# Patient Record
Sex: FEMALE | Race: WHITE | NOT HISPANIC OR LATINO | URBAN - METROPOLITAN AREA
[De-identification: names, ages, dates, MRNs, and addresses within clinical notes are randomized per-mention and may not be internally consistent; named-entity substitution may affect disease eponyms.]

---

## 2024-02-14 ENCOUNTER — OFFICE VISIT (OUTPATIENT)
Dept: OBSTETRICS AND GYNECOLOGY | Facility: CLINIC | Age: 44
End: 2024-02-14
Payer: COMMERCIAL

## 2024-02-14 VITALS — SYSTOLIC BLOOD PRESSURE: 122 MMHG | WEIGHT: 140.38 LBS | DIASTOLIC BLOOD PRESSURE: 86 MMHG

## 2024-02-14 DIAGNOSIS — R10.2 PELVIC PAIN: ICD-10-CM

## 2024-02-14 PROCEDURE — 99203 OFFICE O/P NEW LOW 30 MIN: CPT | Performed by: OBSTETRICS & GYNECOLOGY

## 2024-02-14 RX ORDER — ZOLPIDEM TARTRATE 10 MG/1
TABLET ORAL
COMMUNITY
Start: 2016-03-22

## 2024-02-14 RX ORDER — LOPERAMIDE HYDROCHLORIDE 2 MG/1
CAPSULE ORAL
COMMUNITY
Start: 2023-12-03

## 2024-02-14 RX ORDER — ONDANSETRON HYDROCHLORIDE 8 MG/1
TABLET, FILM COATED ORAL
COMMUNITY

## 2024-02-14 RX ORDER — LOPERAMIDE HCL 1MG/7.5ML
LIQUID (ML) ORAL 2 TIMES DAILY
COMMUNITY

## 2024-02-14 RX ORDER — LEVOTHYROXINE SODIUM 50 UG/1
TABLET ORAL EVERY 24 HOURS
COMMUNITY
Start: 2023-10-13

## 2024-02-14 RX ORDER — TRAMADOL HYDROCHLORIDE 50 MG/1
TABLET ORAL
COMMUNITY
End: 2024-02-22 | Stop reason: ALTCHOICE

## 2024-02-14 NOTE — PROGRESS NOTES
Subjective   Patient ID: Ermelinda Curry is a 43 y.o. female G 12 P 5 who presents for Pelvic Pain.  HPI  Has hx of endometriosis in  , started worsening pain last year.   Pain radiates to legs and back   Also has Factor V , and many recurrent preg losses. So isnt taking any hormonal birth control    x 5   Getting eval for GI issues.   Takes Tylenol w codeine fr Rheumatologist.      Review of Systems  Many GI sx and also pelvic pain , pain w sex.       Objective   Physical Exam  Not done    See US fr [facility], small 1 cm fibroid. No masses , small uterus.   CT scan wnl       Assessment/Plan   Diagnoses and all orders for this visit:  Pelvic pain  -     Referral to Gynecology; Future  Pt best served by specialist for pelvic pain and endometriosis Referral to DR Oh      Pt agrees.    Arlet Galloway MD

## 2024-02-15 ENCOUNTER — TELEPHONE (OUTPATIENT)
Dept: OBSTETRICS AND GYNECOLOGY | Facility: CLINIC | Age: 44
End: 2024-02-15
Payer: COMMERCIAL

## 2024-02-15 NOTE — TELEPHONE ENCOUNTER
Dr. Galloway's office called yesterday re scheduling a follow up apt with this pt with Dr. Oh. Pt is scheduled for 2/22/24 and asked if there would be a procedure done (FUV for pelvic pain/endomet) or if it was just going to be a discussion. Please advise.

## 2024-02-22 ENCOUNTER — OFFICE VISIT (OUTPATIENT)
Dept: OBSTETRICS AND GYNECOLOGY | Facility: CLINIC | Age: 44
End: 2024-02-22
Payer: COMMERCIAL

## 2024-02-22 ENCOUNTER — APPOINTMENT (OUTPATIENT)
Dept: OBSTETRICS AND GYNECOLOGY | Facility: CLINIC | Age: 44
End: 2024-02-22
Payer: COMMERCIAL

## 2024-02-22 VITALS
WEIGHT: 140 LBS | SYSTOLIC BLOOD PRESSURE: 112 MMHG | HEIGHT: 64 IN | DIASTOLIC BLOOD PRESSURE: 62 MMHG | BODY MASS INDEX: 23.9 KG/M2

## 2024-02-22 DIAGNOSIS — N80.9 ENDOMETRIOSIS: Primary | ICD-10-CM

## 2024-02-22 DIAGNOSIS — N80.03 ADENOMYOSIS: ICD-10-CM

## 2024-02-22 DIAGNOSIS — D68.51 FACTOR 5 LEIDEN MUTATION, HETEROZYGOUS (MULTI): ICD-10-CM

## 2024-02-22 DIAGNOSIS — R10.2 PELVIC PAIN: ICD-10-CM

## 2024-02-22 PROCEDURE — 99215 OFFICE O/P EST HI 40 MIN: CPT | Performed by: STUDENT IN AN ORGANIZED HEALTH CARE EDUCATION/TRAINING PROGRAM

## 2024-02-22 RX ORDER — CYCLOBENZAPRINE HCL 5 MG
5 TABLET ORAL 3 TIMES DAILY
Qty: 30 TABLET | Refills: 0 | Status: SHIPPED | OUTPATIENT
Start: 2024-02-22 | End: 2024-03-03

## 2024-02-22 RX ORDER — IBUPROFEN 800 MG/1
800 TABLET ORAL EVERY 8 HOURS PRN
Qty: 90 TABLET | Refills: 0 | Status: SHIPPED | OUTPATIENT
Start: 2024-02-22 | End: 2024-03-29

## 2024-02-22 RX ORDER — ACETAMINOPHEN 325 MG/1
975 TABLET ORAL ONCE
Status: CANCELLED | OUTPATIENT
Start: 2024-02-22 | End: 2024-02-22

## 2024-02-22 RX ORDER — GABAPENTIN 600 MG/1
600 TABLET ORAL ONCE
Status: CANCELLED | OUTPATIENT
Start: 2024-02-22 | End: 2024-02-22

## 2024-02-22 RX ORDER — HEPARIN SODIUM 5000 [USP'U]/ML
5000 INJECTION, SOLUTION INTRAVENOUS; SUBCUTANEOUS ONCE
Status: CANCELLED | OUTPATIENT
Start: 2024-02-22 | End: 2024-02-22

## 2024-02-22 NOTE — PROGRESS NOTES
Division of Minimally Invasive Gynecologic Surgery  [facility]    24 Gynecology Visit    CC:   Chief Complaint   Patient presents with    Consult       Ermelinda Curry is a 43 y.o. referred to our practice by Dr. Galloway.    Had laparoscopy at age 19 which diagnosed her endometriosis. Unsure if they fulgurated or excised. Pain improved after surgery for a few years. Since her last child her pain has worsened. Pain is constant descried as period cramps, rated a 7/8. Pain is constant since January but previously was only related to menses.  Heating pad/baths help. Tylenol with codeine doesn't help. Has monthly menses lasting 2 days but pain is significant. Pain starts 5 days before and then stays for a couple days after. Has not been on medication to regulate periods.     GI symptoms: crampy bowel movement. No blood in stool. Has eosinophilic gastritis.   Urinary symptoms: denies  Sexual activity: abstaining due to pain. Pain with deep penetration. Has to stop due to pain,    Prior Therapies: OCPs    Imaging: TVUS: Retroverted fibroid uterus that measures 93 mm x 60 mm x 62 mm. The endometrial   thickness is 4.2 mm. The fibroid is described below.   1. Size 28 mm x 22 mm x 27 mm. Mean 25.8 mm. Vol 8.813 cm³. FIGO score: 4 -   Intramural, left anterior   The myometrium appears heterogenous; likely consistent with adenomyosis.   Labs: H/H  15.9/47.1    GYN Hx  Gynecologic history:  Contraception/menstrual regulation: none  Desires Childbearing: denies  Last pap: NILM , no HPV testing performed  Her last mammogram was BI-RADS 1 in .   Colonoscopy:  benign polyps  Denies family history of breast, ovarian, uterine, colon or endometrial cancer.     OBHx: 5 x   Pertinent PMH: factor 5 leiden, RPL  Pertient PSH: laparoscopic treatment of endometriosis. XL-mesenteric cyst, cholecystectomy    PMHx, PSHx, SHx, Allergies, and Medications updated in Epic.  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    Other specified health status 04/07/2015    No pertinent past medical history     Past Surgical History:   Procedure Laterality Date    OTHER SURGICAL HISTORY  04/07/2015    History Of Prior Surgery     Allergies   Allergen Reactions    Bee Venom Protein (Honey Bee) Shortness of breath    Estrogens Other     Other reaction(s): Other: See Comments   Clotting disorder and worsening depression    Clotting disorder and worsening depression    Etonogestrel Other     I got very depressed    Fish Containing Products Anaphylaxis and Other     Shortness of breath, wheezing, and vomiting    Ketorolac Anaphylaxis, Unknown, Other and Shortness of breath     Pt states she takes motrin at home    Metoclopramide Anaphylaxis, Unknown, Other and Shortness of breath    Spinach Other     Other reaction(s): Other: See Comments   Upset stomach and migraine headache    Upset stomach and migraine headache    Bee Pollen Other    Ciprofloxacin Other    Copper Hives, Swelling and Unknown    Diphenhydramine Other     Other reaction(s): Other: See Comments   speed, muscle spasms    speed, muscle spasms    Fentanyl Other and Unknown     Other reaction(s): Other: See Comments   Migraine    Migraine    Fluoxetine Hives    Gluten Nausea/vomiting    Milk Containing Products (Dairy) Other    Nitrofurantoin Monohyd/M-Cryst Unknown    Peanut GI Upset    Promethazine Other and Unknown     Other reaction(s): Other: See Comments, Other: See Comments   Muscle spasms    Muscle spasms    Sulfa (Sulfonamide Antibiotics) Unknown    Latex Unknown and Rash     WHEEZING IF CLOSE TO NECK    Macrolide Antibiotics Rash    Penicillins Unknown, Other and Rash       Current Outpatient Medications:      "cyclobenzaprine (Flexeril) 5 mg tablet, Take 1 tablet (5 mg) by mouth 3 times a day for 10 days., Disp: 30 tablet, Rfl: 0    ibuprofen 800 mg tablet, Take 1 tablet (800 mg) by mouth every 8 hours if needed for mild pain (1 - 3)., Disp: 90 tablet, Rfl: 0    levothyroxine (Synthroid, Levoxyl) 50 mcg tablet, once every 24 hours., Disp: , Rfl:     loperamide (Imodium A-D) 1 mg/7.5 mL liquid, Take by mouth twice a day., Disp: , Rfl:     loperamide (Imodium) 2 mg capsule, take 1 capsule as needed Orally twice daily 30 days, Disp: , Rfl:     ondansetron (Zofran) 8 mg tablet, , Disp: , Rfl:     zolpidem (Ambien) 10 mg tablet, , Disp: , Rfl:   Social History     Socioeconomic History    Marital status:      Spouse name: Not on file    Number of children: Not on file    Years of education: Not on file    Highest education level: Not on file   Occupational History    Not on file   Tobacco Use    Smoking status: Not on file    Smokeless tobacco: Not on file   Substance and Sexual Activity    Alcohol use: Not on file    Drug use: Not on file    Sexual activity: Not on file   Other Topics Concern    Not on file   Social History Narrative    Not on file     Social Determinants of Health     Financial Resource Strain: Not on file   Food Insecurity: Not on file   Transportation Needs: Not on file   Physical Activity: Not on file   Stress: Not on file   Social Connections: Not on file   Intimate Partner Violence: Not on file   Housing Stability: Not on file     No family history on file.  OB History    No obstetric history on file.            ROS: reviewed and negative    PE:/62 (BP Location: Left arm, Patient Position: Sitting, BP Cuff Size: Adult)   Ht 1.626 m (5' 4\")   Wt 63.5 kg (140 lb)   LMP 02/09/2024   BMI 24.03 kg/m²   Gen: NAD  Psych: AAOx3  Skin: no lesions  Pulm: nonlabored breathing, normal respiratory effort  Cards: normal peripheral perfusion  Lymph: no LAD in axilla or groin  GI: soft, non-tender, " non-distended, no rebound/guarding, no masses. VM incision from umbilicus to sternum  :  External Genitalia: vulva without lesions, normal hair distribution  Urethral meatus: normal size and without massesBladder: non-tender, no masses or tenderness  Vagina: normal discharge, no lesions or masses  Cervix: without discharge or lesions, no cervical masses, tenderness with cervical manipulation  Uterus: fundal tenderness and not enlarged, mobile  Adnexa: non tender and not enlarged  Anus/Perineum: normal appearance      A/P: Ermelinda Curry is a 43 y.o. with chronic pelvic pain, history of endometriosis, adenomyosis desiring definitive management.     Endometriosis  -Counseling regarding endometriosis was conducted. We discussed the natural history of the disease, superficial endometriosis, endometriomas, and deeply infiltrating disease. Extensively reviewed the medical and surgical treatment options available for endometriosis including NSAIDs, OCPs, progestin therapy (Mirena IUD, depo-provera, norethindrone), GnRH agonists, GnRH antagonists, aromatase inhibitors (i.e. Letrozole), Orilissa (Elagolix tablets), as well as the surgical options including excision of endometriosis alone, excision of endometriosis +/- Mirena IUD +/- appendectomy, and a hysterectomy with preservation of her ovaries. We reviewed at length the modern treatment of endometriosis, recommending preservation of ovaries if normal, despite the presence of extensive disease due to morbidity and mortality benefits.  -We discussed management with surgery, both conservative and extirpative surgery. Recurrence of endometriosis requiring surgical intervention is highest with conservative management. However, this must be balanced against the patient's age and plans for future fertility. In one study published by Carol, women ages 30-39 had approximately equivalent risk of future surgery following hysterectomy or hysterectomy with bilateral oophorectomy.  We discussed the role of hormonal suppression following conservative management, and that this has been demonstrated to reduce the risk of recurrence of disease. We discussed that following extirpative surgery with hysterectomy and bilateral salpingo-oophorectomy, it is our practice to begin progestin-based therapy initially postoperatively, and then transition to estrogen therapy.  If the patient was done with child-bearing, I reviewed that I would generally recommend concurrent hysterectomy in order to also help improve uterine pain related to dysmenorrhea that may be a separate disease process altogether. If adnexal involvement of endometriosis was suspected based on imaging such as endometriosis or other known advanced endometriotic disease based on symptoms or previous procedures was suspected, we reviewed that management may involve the need for a unilateral salpingo-oophorectomy in cases when a cystectomy cannot be performed or significant scarring is encountered that does not allow for ovarian conservation for endometriosis treatment. In patients who do not desire fertility, we reviewed that similarly, the goal would be to maintain one functional ovary, but that if disease is too severe or high suspicion of likely needing repeat surgery in the near future if both ovaries were not removed, then would recommend BSO followed by HRT. We discussed that regardless of conservative or extirpative therapy, we recommended appendectomy given that patients with deep infiltrating endometriosis have 6-times the risk of appendiceal endometriosis compared to those patients without disease.  -The patient desires operative intervention with excision of endometriosis and hysterectomy for definitive management.     Surgical consent reviewed. Risks including bleeding (including need for blood transfusion in life-threatening situations; risks of transfusion including itching, rash, fever, headache or shock, respiratory distress,  kidney damage, systemic infection, exposure to blood borne viruses including hepatitis and HIV, and death), infection, damage to surrounding internal organs including but not limited to blood vessels, nerves, ovaries (if not planning on removing at the time of surgery), ureters, bladder, intestines, possible need for laparotomy and additional surgeries. There is also a risk of hernia, blood clots, as well as potential recovery and anesthesia complications. We also discussed removal of fallopian tube(s) and the reduction in ovarian/fallopian tube cancer with opportunistic salpingectomy. The patient understood all these risk. She understands that surgery does entail more risks than medical and non-invasive therapy. She desired to proceed with surgery following our discussion. Both verbal and written consent was obtained. The patient had the opportunity to answer question      Adenomyosis  Based on the patient's history/physical exam, the patient likely has a component of adenomyosis. Reviewed the diagnosis of adenomyosis with the patient. Discussed with the patient that while adenomyosis can be seen on a MRI with high specificity, it only has a 50% sensitivity. Hence the diagnosis of adenomyosis is generally that of a clinical diagnosis based on risk factors, history and physical exam findings (specifically fundal tenderness on uterine palpation). Reviewed that treatment options of adenomyosis are geared at reducing endometrial activity and hence blood that would be accumulating in the myometrium which is the source of pain and heavy bleeding. Reviewed that this generally is achieved generally best with continuous GLORIA/NuvaRing (if patient able to take estrogen containing birth control), Depo injections, or intrauterine devices (best with Mirena IUD). Reviewed that definitive management, especially if the patient has completed child-bearing, is generally via a hysterectomy which is both confirmatory and therapeutic.      Factor 5 leiden  - needs heparin preop on day of surgery.    Pelvic pain  - Rx motrin, flexeril prn          She will be posted for robotic assisted total laparoscopic hysterectomy, bilateral salpingectomy, excision of endometriosis, cystoscopy and all indicated procedures    RTC postop    Britt Oh MD  Division of Minimally Invasive Gynecologic Surgery  [facility]

## 2024-03-28 ENCOUNTER — ANESTHESIA EVENT (OUTPATIENT)
Dept: OPERATING ROOM | Facility: HOSPITAL | Age: 44
End: 2024-03-28
Payer: COMMERCIAL

## 2024-03-29 ENCOUNTER — HOSPITAL ENCOUNTER (OUTPATIENT)
Facility: HOSPITAL | Age: 44
Setting detail: OUTPATIENT SURGERY
Discharge: HOME | End: 2024-03-29
Attending: STUDENT IN AN ORGANIZED HEALTH CARE EDUCATION/TRAINING PROGRAM | Admitting: STUDENT IN AN ORGANIZED HEALTH CARE EDUCATION/TRAINING PROGRAM
Payer: COMMERCIAL

## 2024-03-29 ENCOUNTER — ANESTHESIA (OUTPATIENT)
Dept: OPERATING ROOM | Facility: HOSPITAL | Age: 44
End: 2024-03-29
Payer: COMMERCIAL

## 2024-03-29 VITALS
SYSTOLIC BLOOD PRESSURE: 134 MMHG | OXYGEN SATURATION: 98 % | HEART RATE: 86 BPM | TEMPERATURE: 97.7 F | RESPIRATION RATE: 12 BRPM | DIASTOLIC BLOOD PRESSURE: 85 MMHG

## 2024-03-29 DIAGNOSIS — N80.9 ENDOMETRIOSIS: Primary | ICD-10-CM

## 2024-03-29 DIAGNOSIS — R10.2 PELVIC PAIN: ICD-10-CM

## 2024-03-29 DIAGNOSIS — N80.03 ADENOMYOSIS: ICD-10-CM

## 2024-03-29 LAB
ABO GROUP (TYPE) IN BLOOD: NORMAL
ANTIBODY SCREEN: NORMAL
HCG, URINE EXTERNAL: NEGATIVE
INTERNAL CONTROL HCG, URINE EXTERNAL: NORMAL
PREGNANCY TEST URINE, POC: NEGATIVE
RH FACTOR (ANTIGEN D): NORMAL

## 2024-03-29 PROCEDURE — A4217 STERILE WATER/SALINE, 500 ML: HCPCS | Mod: SE | Performed by: STUDENT IN AN ORGANIZED HEALTH CARE EDUCATION/TRAINING PROGRAM

## 2024-03-29 PROCEDURE — 2500000004 HC RX 250 GENERAL PHARMACY W/ HCPCS (ALT 636 FOR OP/ED): Mod: SE | Performed by: ANESTHESIOLOGY

## 2024-03-29 PROCEDURE — 2720000007 HC OR 272 NO HCPCS: Performed by: STUDENT IN AN ORGANIZED HEALTH CARE EDUCATION/TRAINING PROGRAM

## 2024-03-29 PROCEDURE — 7100000010 HC PHASE TWO TIME - EACH INCREMENTAL 1 MINUTE: Performed by: STUDENT IN AN ORGANIZED HEALTH CARE EDUCATION/TRAINING PROGRAM

## 2024-03-29 PROCEDURE — A58571 PR LAPAROSCOPY W TOT HYSTERECTUTERUS <=250 GRAM  W TUBE/OVARY: Performed by: ANESTHESIOLOGY

## 2024-03-29 PROCEDURE — 2500000005 HC RX 250 GENERAL PHARMACY W/O HCPCS: Mod: SE | Performed by: STUDENT IN AN ORGANIZED HEALTH CARE EDUCATION/TRAINING PROGRAM

## 2024-03-29 PROCEDURE — A58571 PR LAPAROSCOPY W TOT HYSTERECTUTERUS <=250 GRAM  W TUBE/OVARY: Performed by: NURSE ANESTHETIST, CERTIFIED REGISTERED

## 2024-03-29 PROCEDURE — 96372 THER/PROPH/DIAG INJ SC/IM: CPT | Performed by: STUDENT IN AN ORGANIZED HEALTH CARE EDUCATION/TRAINING PROGRAM

## 2024-03-29 PROCEDURE — 2500000004 HC RX 250 GENERAL PHARMACY W/ HCPCS (ALT 636 FOR OP/ED): Mod: SE | Performed by: STUDENT IN AN ORGANIZED HEALTH CARE EDUCATION/TRAINING PROGRAM

## 2024-03-29 PROCEDURE — 88307 TISSUE EXAM BY PATHOLOGIST: CPT | Mod: TC,SUR | Performed by: STUDENT IN AN ORGANIZED HEALTH CARE EDUCATION/TRAINING PROGRAM

## 2024-03-29 PROCEDURE — 3600000017 HC OR TIME - EACH INCREMENTAL 1 MINUTE - PROCEDURE LEVEL SIX: Performed by: STUDENT IN AN ORGANIZED HEALTH CARE EDUCATION/TRAINING PROGRAM

## 2024-03-29 PROCEDURE — 3600000018 HC OR TIME - INITIAL BASE CHARGE - PROCEDURE LEVEL SIX: Performed by: STUDENT IN AN ORGANIZED HEALTH CARE EDUCATION/TRAINING PROGRAM

## 2024-03-29 PROCEDURE — 7100000009 HC PHASE TWO TIME - INITIAL BASE CHARGE: Performed by: STUDENT IN AN ORGANIZED HEALTH CARE EDUCATION/TRAINING PROGRAM

## 2024-03-29 PROCEDURE — 7100000002 HC RECOVERY ROOM TIME - EACH INCREMENTAL 1 MINUTE: Performed by: STUDENT IN AN ORGANIZED HEALTH CARE EDUCATION/TRAINING PROGRAM

## 2024-03-29 PROCEDURE — 88307 TISSUE EXAM BY PATHOLOGIST: CPT | Performed by: PATHOLOGY

## 2024-03-29 PROCEDURE — 2500000001 HC RX 250 WO HCPCS SELF ADMINISTERED DRUGS (ALT 637 FOR MEDICARE OP): Mod: SE | Performed by: STUDENT IN AN ORGANIZED HEALTH CARE EDUCATION/TRAINING PROGRAM

## 2024-03-29 PROCEDURE — 2500000001 HC RX 250 WO HCPCS SELF ADMINISTERED DRUGS (ALT 637 FOR MEDICARE OP): Mod: SE | Performed by: ANESTHESIOLOGY

## 2024-03-29 PROCEDURE — 2500000004 HC RX 250 GENERAL PHARMACY W/ HCPCS (ALT 636 FOR OP/ED): Mod: SE

## 2024-03-29 PROCEDURE — 86850 RBC ANTIBODY SCREEN: CPT | Performed by: STUDENT IN AN ORGANIZED HEALTH CARE EDUCATION/TRAINING PROGRAM

## 2024-03-29 PROCEDURE — 58571 TLH W/T/O 250 G OR LESS: CPT | Performed by: STUDENT IN AN ORGANIZED HEALTH CARE EDUCATION/TRAINING PROGRAM

## 2024-03-29 PROCEDURE — S2900 ROBOTIC SURGICAL SYSTEM: HCPCS | Performed by: STUDENT IN AN ORGANIZED HEALTH CARE EDUCATION/TRAINING PROGRAM

## 2024-03-29 PROCEDURE — 96372 THER/PROPH/DIAG INJ SC/IM: CPT

## 2024-03-29 PROCEDURE — 3700000002 HC GENERAL ANESTHESIA TIME - EACH INCREMENTAL 1 MINUTE: Performed by: STUDENT IN AN ORGANIZED HEALTH CARE EDUCATION/TRAINING PROGRAM

## 2024-03-29 PROCEDURE — 3700000001 HC GENERAL ANESTHESIA TIME - INITIAL BASE CHARGE: Performed by: STUDENT IN AN ORGANIZED HEALTH CARE EDUCATION/TRAINING PROGRAM

## 2024-03-29 PROCEDURE — 2500000005 HC RX 250 GENERAL PHARMACY W/O HCPCS: Mod: SE

## 2024-03-29 PROCEDURE — 81025 URINE PREGNANCY TEST: CPT | Performed by: ANESTHESIOLOGY

## 2024-03-29 PROCEDURE — 7100000001 HC RECOVERY ROOM TIME - INITIAL BASE CHARGE: Performed by: STUDENT IN AN ORGANIZED HEALTH CARE EDUCATION/TRAINING PROGRAM

## 2024-03-29 PROCEDURE — 36415 COLL VENOUS BLD VENIPUNCTURE: CPT | Performed by: STUDENT IN AN ORGANIZED HEALTH CARE EDUCATION/TRAINING PROGRAM

## 2024-03-29 RX ORDER — SODIUM CHLORIDE, SODIUM LACTATE, POTASSIUM CHLORIDE, CALCIUM CHLORIDE 600; 310; 30; 20 MG/100ML; MG/100ML; MG/100ML; MG/100ML
100 INJECTION, SOLUTION INTRAVENOUS CONTINUOUS
Status: DISCONTINUED | OUTPATIENT
Start: 2024-03-29 | End: 2024-03-29 | Stop reason: HOSPADM

## 2024-03-29 RX ORDER — HYDROMORPHONE HYDROCHLORIDE 1 MG/ML
0.2 INJECTION, SOLUTION INTRAMUSCULAR; INTRAVENOUS; SUBCUTANEOUS EVERY 5 MIN PRN
Status: DISCONTINUED | OUTPATIENT
Start: 2024-03-29 | End: 2024-03-29 | Stop reason: HOSPADM

## 2024-03-29 RX ORDER — ACETAMINOPHEN 325 MG/1
975 TABLET ORAL ONCE
Status: CANCELLED | OUTPATIENT
Start: 2024-03-29 | End: 2024-03-29

## 2024-03-29 RX ORDER — ONDANSETRON HYDROCHLORIDE 2 MG/ML
4 INJECTION, SOLUTION INTRAVENOUS ONCE AS NEEDED
Status: DISCONTINUED | OUTPATIENT
Start: 2024-03-29 | End: 2024-03-29 | Stop reason: HOSPADM

## 2024-03-29 RX ORDER — SODIUM CHLORIDE, SODIUM LACTATE, POTASSIUM CHLORIDE, CALCIUM CHLORIDE 600; 310; 30; 20 MG/100ML; MG/100ML; MG/100ML; MG/100ML
INJECTION, SOLUTION INTRAVENOUS CONTINUOUS PRN
Status: DISCONTINUED | OUTPATIENT
Start: 2024-03-29 | End: 2024-03-29

## 2024-03-29 RX ORDER — HEPARIN SODIUM 5000 [USP'U]/ML
5000 INJECTION, SOLUTION INTRAVENOUS; SUBCUTANEOUS ONCE
Status: COMPLETED | OUTPATIENT
Start: 2024-03-29 | End: 2024-03-29

## 2024-03-29 RX ORDER — POLYETHYLENE GLYCOL 3350 17 G/17G
17 POWDER, FOR SOLUTION ORAL DAILY
Qty: 10 PACKET | Refills: 1 | Status: SHIPPED | OUTPATIENT
Start: 2024-03-29

## 2024-03-29 RX ORDER — GABAPENTIN 600 MG/1
600 TABLET ORAL ONCE
Status: COMPLETED | OUTPATIENT
Start: 2024-03-29 | End: 2024-03-29

## 2024-03-29 RX ORDER — ROCURONIUM BROMIDE 10 MG/ML
INJECTION, SOLUTION INTRAVENOUS AS NEEDED
Status: DISCONTINUED | OUTPATIENT
Start: 2024-03-29 | End: 2024-03-29

## 2024-03-29 RX ORDER — OXYCODONE HYDROCHLORIDE 5 MG/1
5 TABLET ORAL EVERY 4 HOURS PRN
Status: DISCONTINUED | OUTPATIENT
Start: 2024-03-29 | End: 2024-03-29 | Stop reason: HOSPADM

## 2024-03-29 RX ORDER — GABAPENTIN 600 MG/1
600 TABLET ORAL ONCE
Status: CANCELLED | OUTPATIENT
Start: 2024-03-29 | End: 2024-03-29

## 2024-03-29 RX ORDER — LIDOCAINE HYDROCHLORIDE 10 MG/ML
0.1 INJECTION, SOLUTION EPIDURAL; INFILTRATION; INTRACAUDAL; PERINEURAL ONCE
Status: DISCONTINUED | OUTPATIENT
Start: 2024-03-29 | End: 2024-03-29 | Stop reason: HOSPADM

## 2024-03-29 RX ORDER — OXYCODONE HYDROCHLORIDE 5 MG/1
5 TABLET ORAL EVERY 6 HOURS PRN
Qty: 10 TABLET | Refills: 0 | Status: SHIPPED | OUTPATIENT
Start: 2024-03-29

## 2024-03-29 RX ORDER — ONDANSETRON HYDROCHLORIDE 2 MG/ML
INJECTION, SOLUTION INTRAVENOUS AS NEEDED
Status: DISCONTINUED | OUTPATIENT
Start: 2024-03-29 | End: 2024-03-29

## 2024-03-29 RX ORDER — PHENYLEPHRINE HCL IN 0.9% NACL 0.4MG/10ML
SYRINGE (ML) INTRAVENOUS AS NEEDED
Status: DISCONTINUED | OUTPATIENT
Start: 2024-03-29 | End: 2024-03-29

## 2024-03-29 RX ORDER — ESMOLOL HYDROCHLORIDE 10 MG/ML
INJECTION INTRAVENOUS AS NEEDED
Status: DISCONTINUED | OUTPATIENT
Start: 2024-03-29 | End: 2024-03-29

## 2024-03-29 RX ORDER — HYDROMORPHONE HYDROCHLORIDE 1 MG/ML
0.5 INJECTION, SOLUTION INTRAMUSCULAR; INTRAVENOUS; SUBCUTANEOUS EVERY 5 MIN PRN
Status: DISCONTINUED | OUTPATIENT
Start: 2024-03-29 | End: 2024-03-29 | Stop reason: HOSPADM

## 2024-03-29 RX ORDER — ACETAMINOPHEN 325 MG/1
650 TABLET ORAL EVERY 4 HOURS PRN
Status: DISCONTINUED | OUTPATIENT
Start: 2024-03-29 | End: 2024-03-29 | Stop reason: HOSPADM

## 2024-03-29 RX ORDER — MIDAZOLAM HYDROCHLORIDE 1 MG/ML
1 INJECTION INTRAMUSCULAR; INTRAVENOUS ONCE
Status: COMPLETED | OUTPATIENT
Start: 2024-03-29 | End: 2024-03-29

## 2024-03-29 RX ORDER — MIDAZOLAM HYDROCHLORIDE 1 MG/ML
INJECTION INTRAMUSCULAR; INTRAVENOUS AS NEEDED
Status: DISCONTINUED | OUTPATIENT
Start: 2024-03-29 | End: 2024-03-29

## 2024-03-29 RX ORDER — METRONIDAZOLE 500 MG/100ML
INJECTION, SOLUTION INTRAVENOUS AS NEEDED
Status: DISCONTINUED | OUTPATIENT
Start: 2024-03-29 | End: 2024-03-29

## 2024-03-29 RX ORDER — HYDRALAZINE HYDROCHLORIDE 20 MG/ML
5 INJECTION INTRAMUSCULAR; INTRAVENOUS EVERY 30 MIN PRN
Status: DISCONTINUED | OUTPATIENT
Start: 2024-03-29 | End: 2024-03-29 | Stop reason: HOSPADM

## 2024-03-29 RX ORDER — SODIUM CHLORIDE 0.9 G/100ML
IRRIGANT IRRIGATION AS NEEDED
Status: DISCONTINUED | OUTPATIENT
Start: 2024-03-29 | End: 2024-03-29 | Stop reason: HOSPADM

## 2024-03-29 RX ORDER — HYDROMORPHONE HYDROCHLORIDE 1 MG/ML
INJECTION, SOLUTION INTRAMUSCULAR; INTRAVENOUS; SUBCUTANEOUS AS NEEDED
Status: DISCONTINUED | OUTPATIENT
Start: 2024-03-29 | End: 2024-03-29

## 2024-03-29 RX ORDER — HEPARIN SODIUM 5000 [USP'U]/ML
5000 INJECTION, SOLUTION INTRAVENOUS; SUBCUTANEOUS ONCE
Status: CANCELLED | OUTPATIENT
Start: 2024-03-29 | End: 2024-03-29

## 2024-03-29 RX ORDER — LABETALOL HYDROCHLORIDE 5 MG/ML
5 INJECTION, SOLUTION INTRAVENOUS ONCE AS NEEDED
Status: DISCONTINUED | OUTPATIENT
Start: 2024-03-29 | End: 2024-03-29 | Stop reason: HOSPADM

## 2024-03-29 RX ORDER — OXYCODONE HYDROCHLORIDE 5 MG/1
10 TABLET ORAL EVERY 4 HOURS PRN
Status: DISCONTINUED | OUTPATIENT
Start: 2024-03-29 | End: 2024-03-29 | Stop reason: HOSPADM

## 2024-03-29 RX ORDER — ACETAMINOPHEN 500 MG
1000 TABLET ORAL EVERY 6 HOURS PRN
Qty: 60 TABLET | Refills: 1 | Status: SHIPPED | OUTPATIENT
Start: 2024-03-29 | End: 2024-05-28

## 2024-03-29 RX ORDER — NALOXONE HYDROCHLORIDE 4 MG/.1ML
4 SPRAY NASAL AS NEEDED
Qty: 2 EACH | Refills: 1 | Status: SHIPPED | OUTPATIENT
Start: 2024-03-29

## 2024-03-29 RX ORDER — ALBUTEROL SULFATE 0.83 MG/ML
2.5 SOLUTION RESPIRATORY (INHALATION) ONCE AS NEEDED
Status: DISCONTINUED | OUTPATIENT
Start: 2024-03-29 | End: 2024-03-29 | Stop reason: HOSPADM

## 2024-03-29 RX ORDER — BUPIVACAINE HYDROCHLORIDE 5 MG/ML
INJECTION, SOLUTION PERINEURAL AS NEEDED
Status: DISCONTINUED | OUTPATIENT
Start: 2024-03-29 | End: 2024-03-29 | Stop reason: HOSPADM

## 2024-03-29 RX ORDER — SCOLOPAMINE TRANSDERMAL SYSTEM 1 MG/1
PATCH, EXTENDED RELEASE TRANSDERMAL AS NEEDED
Status: DISCONTINUED | OUTPATIENT
Start: 2024-03-29 | End: 2024-03-29

## 2024-03-29 RX ORDER — CEFAZOLIN 1 G/1
INJECTION, POWDER, FOR SOLUTION INTRAVENOUS AS NEEDED
Status: DISCONTINUED | OUTPATIENT
Start: 2024-03-29 | End: 2024-03-29

## 2024-03-29 RX ORDER — PROPOFOL 10 MG/ML
INJECTION, EMULSION INTRAVENOUS AS NEEDED
Status: DISCONTINUED | OUTPATIENT
Start: 2024-03-29 | End: 2024-03-29

## 2024-03-29 RX ORDER — METHOCARBAMOL 100 MG/ML
1000 INJECTION, SOLUTION INTRAMUSCULAR; INTRAVENOUS ONCE
Status: COMPLETED | OUTPATIENT
Start: 2024-03-29 | End: 2024-03-29

## 2024-03-29 RX ORDER — ACETAMINOPHEN 325 MG/1
975 TABLET ORAL ONCE
Status: COMPLETED | OUTPATIENT
Start: 2024-03-29 | End: 2024-03-29

## 2024-03-29 RX ORDER — LIDOCAINE HCL/PF 100 MG/5ML
SYRINGE (ML) INTRAVENOUS AS NEEDED
Status: DISCONTINUED | OUTPATIENT
Start: 2024-03-29 | End: 2024-03-29

## 2024-03-29 RX ADMIN — HYDROMORPHONE HYDROCHLORIDE 0.5 MG: 1 INJECTION, SOLUTION INTRAMUSCULAR; INTRAVENOUS; SUBCUTANEOUS at 10:57

## 2024-03-29 RX ADMIN — LIDOCAINE HYDROCHLORIDE 60 MG: 20 INJECTION INTRAVENOUS at 07:53

## 2024-03-29 RX ADMIN — METHOCARBAMOL 1000 MG: 100 INJECTION, SOLUTION INTRAMUSCULAR; INTRAVENOUS at 10:50

## 2024-03-29 RX ADMIN — SCOPALAMINE 1 PATCH: 1 PATCH, EXTENDED RELEASE TRANSDERMAL at 07:40

## 2024-03-29 RX ADMIN — ROCURONIUM BROMIDE 20 MG: 10 INJECTION INTRAVENOUS at 09:21

## 2024-03-29 RX ADMIN — DEXAMETHASONE SODIUM PHOSPHATE 8 MG: 4 INJECTION INTRA-ARTICULAR; INTRALESIONAL; INTRAMUSCULAR; INTRAVENOUS; SOFT TISSUE at 08:00

## 2024-03-29 RX ADMIN — OXYCODONE HYDROCHLORIDE 5 MG: 5 TABLET ORAL at 13:18

## 2024-03-29 RX ADMIN — SODIUM CHLORIDE, POTASSIUM CHLORIDE, SODIUM LACTATE AND CALCIUM CHLORIDE: 600; 310; 30; 20 INJECTION, SOLUTION INTRAVENOUS at 09:50

## 2024-03-29 RX ADMIN — SODIUM CHLORIDE, POTASSIUM CHLORIDE, SODIUM LACTATE AND CALCIUM CHLORIDE: 600; 310; 30; 20 INJECTION, SOLUTION INTRAVENOUS at 07:40

## 2024-03-29 RX ADMIN — GABAPENTIN 600 MG: 300 CAPSULE ORAL at 07:10

## 2024-03-29 RX ADMIN — SODIUM CHLORIDE, POTASSIUM CHLORIDE, SODIUM LACTATE AND CALCIUM CHLORIDE 100 ML/HR: 600; 310; 30; 20 INJECTION, SOLUTION INTRAVENOUS at 11:00

## 2024-03-29 RX ADMIN — MIDAZOLAM HYDROCHLORIDE 1 MG: 1 INJECTION, SOLUTION INTRAMUSCULAR; INTRAVENOUS at 11:26

## 2024-03-29 RX ADMIN — HYDROMORPHONE HYDROCHLORIDE 1 MG: 1 INJECTION, SOLUTION INTRAMUSCULAR; INTRAVENOUS; SUBCUTANEOUS at 08:47

## 2024-03-29 RX ADMIN — HYDROMORPHONE HYDROCHLORIDE 0.5 MG: 1 INJECTION, SOLUTION INTRAMUSCULAR; INTRAVENOUS; SUBCUTANEOUS at 12:15

## 2024-03-29 RX ADMIN — SODIUM CHLORIDE, SODIUM LACTATE, POTASSIUM CHLORIDE, AND CALCIUM CHLORIDE: 600; 310; 30; 20 INJECTION, SOLUTION INTRAVENOUS at 08:43

## 2024-03-29 RX ADMIN — ROCURONIUM BROMIDE 50 MG: 10 INJECTION INTRAVENOUS at 07:54

## 2024-03-29 RX ADMIN — ESMOLOL HYDROCHLORIDE 25 MG: 10 INJECTION, SOLUTION INTRAVENOUS at 07:53

## 2024-03-29 RX ADMIN — PROPOFOL 200 MG: 10 INJECTION, EMULSION INTRAVENOUS at 07:53

## 2024-03-29 RX ADMIN — HYDROMORPHONE HYDROCHLORIDE 0.5 MG: 1 INJECTION, SOLUTION INTRAMUSCULAR; INTRAVENOUS; SUBCUTANEOUS at 12:30

## 2024-03-29 RX ADMIN — Medication 40 MCG: at 10:18

## 2024-03-29 RX ADMIN — HEPARIN SODIUM 5000 UNITS: 5000 INJECTION INTRAVENOUS; SUBCUTANEOUS at 07:09

## 2024-03-29 RX ADMIN — HYDROMORPHONE HYDROCHLORIDE 0.5 MG: 1 INJECTION, SOLUTION INTRAMUSCULAR; INTRAVENOUS; SUBCUTANEOUS at 11:05

## 2024-03-29 RX ADMIN — ACETAMINOPHEN 975 MG: 325 TABLET ORAL at 07:09

## 2024-03-29 RX ADMIN — METRONIDAZOLE 500 MG: 500 INJECTION, SOLUTION INTRAVENOUS at 08:20

## 2024-03-29 RX ADMIN — Medication 20 MG: at 08:41

## 2024-03-29 RX ADMIN — CEFAZOLIN 2 G: 330 INJECTION, POWDER, FOR SOLUTION INTRAMUSCULAR; INTRAVENOUS at 08:11

## 2024-03-29 RX ADMIN — HYDROMORPHONE HYDROCHLORIDE 1 MG: 1 INJECTION, SOLUTION INTRAMUSCULAR; INTRAVENOUS; SUBCUTANEOUS at 10:41

## 2024-03-29 RX ADMIN — ONDANSETRON 4 MG: 2 INJECTION INTRAMUSCULAR; INTRAVENOUS at 10:16

## 2024-03-29 RX ADMIN — DEXTROSE MONOHYDRATE 0.25 MG/KG/HR: 50 INJECTION, SOLUTION INTRAVENOUS at 08:52

## 2024-03-29 RX ADMIN — MIDAZOLAM HYDROCHLORIDE 2 MG: 1 INJECTION, SOLUTION INTRAMUSCULAR; INTRAVENOUS at 07:46

## 2024-03-29 SDOH — HEALTH STABILITY: MENTAL HEALTH: CURRENT SMOKER: 1

## 2024-03-29 ASSESSMENT — PAIN SCALES - GENERAL
PAINLEVEL_OUTOF10: 10 - WORST POSSIBLE PAIN
PAINLEVEL_OUTOF10: 10 - WORST POSSIBLE PAIN
PAINLEVEL_OUTOF10: 6
PAINLEVEL_OUTOF10: 8
PAINLEVEL_OUTOF10: 0 - NO PAIN
PAINLEVEL_OUTOF10: 9

## 2024-03-29 ASSESSMENT — PAIN - FUNCTIONAL ASSESSMENT
PAIN_FUNCTIONAL_ASSESSMENT: 0-10
PAIN_FUNCTIONAL_ASSESSMENT: UNABLE TO SELF-REPORT
PAIN_FUNCTIONAL_ASSESSMENT: 0-10

## 2024-03-29 ASSESSMENT — PAIN DESCRIPTION - LOCATION
LOCATION: ABDOMEN

## 2024-03-29 ASSESSMENT — PAIN DESCRIPTION - ORIENTATION
ORIENTATION: MID

## 2024-03-29 NOTE — OP NOTE
Date: 3/29/2024  OR Location: [facility] OR    Name: Ermelinda Curry, : 1980, Age: 43 y.o., MRN: [id number], Sex: female    Diagnosis  Pre-op Diagnosis     * Pelvic pain [R10.2]     * Endometriosis [N80.9]     * Adenomyosis [N80.03] Post-op Diagnosis     * Pelvic pain [R10.2]      No evidence of Endometriosis      * Adenomyosis [N80.03]     Procedures  Robotic assisted total laparoscopic hysterectomy  Bilateral salpingectomy  cystoscopy    Surgeons      * Britt Oh - Primary    Resident/Fellow/Other Assistant:  Surgeon(s) and Role:     * Kavita Everett MD - Assisting     * Arianna Caputo MD - Assisting    Procedure Summary  Anesthesia: General  ASA: II  Anesthesia Staff: Anesthesiologist: Ada Kenyon MD  CRNA: Keyanna Velasco APRN-CRNA; VINCE Liz-CRNA  SRNA: Jose Gleason RN  Estimated Blood Loss: 25mL  Intra-op Medications:   Administrations occurring from 0715 to 1015 on 24:   Medication Name Total Dose   BUPivacaine HCl (Marcaine) 0.5 % (5 mg/mL) injection 18 mL              Anesthesia Record               Intraprocedure I/O Totals          Intake    Dexmedetomidine 0.00 mL    The total shown is the total volume documented since Anesthesia Start was filed.    Ketamine 0.00 mL    The total shown is the total volume documented since Anesthesia Start was filed.    LR bolus 500.00 mL    lactated Ringer's 1050.00 mL    Total Intake 1550 mL       Output    Est. Blood Loss 25 mL    Total Output 25 mL       Net    Net Volume 1525 mL          Specimen:   ID Type Source Tests Collected by Time   1 : Uterus, Cervix, Bilateral Fallopian Tubes Tissue UTERUS, CERVIX, AND BILATERAL FALLOPIAN TUBES SURGICAL PATHOLOGY EXAM Britt Oh MD 3/29/2024 1051        Staff:   Circulator: Shannon Herring RN  Scrub Person: Ashlee Valente RN    FINDINGS: Exam under anesthesia revealed 10 week size uterus which was globular and boggy with an anterior fibroid.  Diagnostic laparoscopy demonstrated normal  abdominal and pelvic anatomy No evidence of endometriosis. Normal appearing ovaries with a corpus luteal cyst on the right. Cystoscopy revealed normal bladder mucosa with efflux of urine from bilateral ureteral orifices.    COMPLICATIONS: None    INDICATIONS: Ermelinda Curry is a 43 y.o.  with chronic pelvic pain, history of endometriosis and adenomyosis who presented for definitive surgical management. After discussing management options, the patient desired to proceed with surgery. The risks, benefits, and alternatives were reviewed with the patient. All questions were answered and consents were signed.    DESCRIPTION OF PROCEDURE:   After obtaining verbal and written consent, the patient was taken to the operating room with adequate intravenous access. At this point the patient was placed in supine position, bilateral sequential compression devices were placed on her lower extremities, and general anesthesia was administered. The patient was then prepped and draped in dorsal lithotomy position. Examination under anesthesia showed the above findings. A holm catheter was placed for drainage of the bladder. A speculum was placed into the vagina with good visualization of the cervix. The anterior lip of the cervix was grasped with a  single tooth tenaculum. The uterus was sounded and sequentially dilated. A Vcare manipulator was placed. The speculum and tenaculum were removed from the vagina.     Attention was then turned to abdominal entry. The abdomen was entered at Thomas's point. An 8 mm skin incision was placed at this location and an Airseal port was placed under direct visualization via the Optiview method with the laparoscope. Correct placement was confirmed with a low opening pressure and visualization of the intraabdominal contents. The abdomen was then insufflated to 15mmHg and the area directly under the trocar was visualized to confirm that there was no visceral or vessel injury. A complete upper  abdominal survey was completed with the findings as above.  Three additional robotic 8 mm ports were placed, two in the right mid abdomen, one in the left under direct visualization atraumatically. The patient was placed in Trendelenberg and attention was turned to the pelvis.      The hysterectomy was then started on the right side of the uterus. The round ligament was identified, transected and the pelvic sidewall peritoneum was incised. This incision was extended parallel to the infundibulopelvic ligament and the retroperitoneal space was entered. The anterior leaf of the broad ligament was incised toward the cervix. The vesicouterine space was identified was dissected to create the bladder flap. The right fallopian tube was then  from its attachments by serially sealing and dividing the mesosalpinx with the ligasure. The ureter was then identified in the retroperitoneal space and lateralized.  A window was then created in the posterior leaf of the broad ligament while keeping the ureter in view at all times. The uteroovarian ligament was desiccated and transected with the ligasure. The posterior peritoneum of the broad ligament was further dissected down to the level of the cup, keeping the ureter in view. The uterine vessels were then skeletonized and the ureter further lateralized. The uterine artery was then desiccated and transected with excellent hemostasis obtained. Attention was then turned to the left side of the hysterectomy. The same steps were repeated on the left side. The colpotomy was made circumferentially until the uterus and cervix were  from the vagina and removed intact.    The colpotomy was then closed in two layers with 0-Vlock barbed suture. The pelvis was irrigated. All pedicles and areas of dissection were inspected and hemostasis was noted. The insfullation pressure was decreased to 5 mmHg and hemostasis was again noted. The robot was undocked. The abdomen was  desufflated and 5 positive pressure breaths were administered by the anesthesiologist. All ports and instruments were removed from the abdomen. All skin incisions were closed with 4-0 monocryl suture and covered with dermabond. 0.5% bupivicaine was injected at all trocar sites.      Attention was then turned to the cystoscopy. The Warner catheter was removed. A cystoscope was carefully introduced into the urethra into the bladder. The bladder was distended with sterile water. Vigorous jets were noted from the bilateral ureteral orifices. Once these ureters were found to be functioning adequately, attention was then turned to a complete surveillance of the remainder of the bladder. This was done in a clockwise fashion, noting no evidence of traumatic injury or sutures in the bladder. The bladder was decompressed. The cystoscope was carefully removed. The vagina was inspected and no vaginal lacerations were noted. All instruments were removed from the operative field. The patient was taken out of dorsal lithotomy position. She was awakened without difficulty and transferred to the recovery room in stable condition. Sponge, lap and needle counts were correct x2.    Britt Oh MD  Division of Minimally Invasive Gynecologic Surgery  [facility]

## 2024-03-29 NOTE — H&P
History Of Present Illness  Ermelinda Curry is a 43 y.o. female presenting with chronic pelvic pain, history of endometriosis, adenomyosis who presents robotic assisted total laparoscopic hysterectomy, bilateral salpingectomy, excision of endometriosis, cystoscopy and all indicated procedures.     Had laparoscopy at age 19 which diagnosed her endometriosis. Unsure if they fulgurated or excised. Pain improved after surgery for a few years. Since her last child her pain has worsened. Pain is constant descried as period cramps, rated a 7/8. Pain is constant since January but previously was only related to menses.  Heating pad/baths help. Tylenol with codeine doesn't help. Has monthly menses lasting 2 days but pain is significant. Pain starts 5 days before and then stays for a couple days after. Has not been on medication to regulate periods.      GI symptoms: crampy bowel movement. No blood in stool. Has eosinophilic gastritis.   Urinary symptoms: denies  Sexual activity: abstaining due to pain. Pain with deep penetration. Has to stop due to pain,     Prior Therapies: OCPs     Imaging: TVUS: Retroverted fibroid uterus that measures 93 mm x 60 mm x 62 mm. The endometrial   thickness is 4.2 mm. The fibroid is described below.   1. Size 28 mm x 22 mm x 27 mm. Mean 25.8 mm. Vol 8.813 cm³. FIGO score: 4 -   Intramural, left anterior   The myometrium appears heterogenous; likely consistent with adenomyosis.   Labs: H/H  15.9/47.1     GYN Hx  Gynecologic history:  Contraception/menstrual regulation: none  Desires Childbearing: denies  Last pap: NILM , no HPV testing performed  Her last mammogram was BI-RADS 1 in .   Colonoscopy:  benign polyps  Denies family history of breast, ovarian, uterine, colon or endometrial cancer.      OBHx: 5 x   Pertinent PMH: factor 5 leiden, RPL  Pertient PSH: laparoscopic treatment of endometriosis. 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    Family History  No family history on file.     Allergies  Bee venom protein (honey bee), Estrogens, Etonogestrel, Fish containing products, Ketorolac, Metoclopramide, Spinach, Bee pollen, Ciprofloxacin, Copper, Diphenhydramine, Fentanyl, Fluoxetine, Gluten, Milk containing products (dairy), Nitrofurantoin monohyd/m-cryst, Peanut, Promethazine, Sulfa (sulfonamide antibiotics), Latex, Macrolide antibiotics, and Penicillins    Review of Systems   All other systems reviewed and are negative.       Physical Exam  Constitutional:       General: She is not in acute distress.     Appearance: Normal appearance.   HENT:      Head: Normocephalic and atraumatic.      Nose: Nose normal.   Eyes:      General: No scleral icterus.  Cardiovascular:      Rate and Rhythm: Normal rate.   Pulmonary:      Effort: Pulmonary effort is normal.   Abdominal:      Palpations: Abdomen is soft.   Musculoskeletal:         General: Normal range of motion.      Cervical back: Normal range of motion.   Skin:     General: Skin is warm and dry.   Neurological:      General: No focal deficit present.      Mental Status: She is alert and oriented to person, place, and time.   Psychiatric:         Mood and Affect: Mood normal.         Behavior: Behavior normal.          Last Recorded Vitals  There were no vitals taken for this visit.     Assessment/Plan   Principal Problem:     Endometriosis  Active Problems:    Pelvic pain    Adenomyosis    Ermelinda Curry is a 43 y.o. female presenting with chronic pelvic pain, history of endometriosis, adenomyosis who presents robotic assisted total laparoscopic hysterectomy, bilateral salpingectomy, excision of endometriosis, cystoscopy and all indicated procedures.    Discussed and seen with Dr. Adriano Caputo MD

## 2024-03-29 NOTE — ANESTHESIA PROCEDURE NOTES
Peripheral IV  Date/Time: 3/29/2024 8:25 AM  Inserted by: Hemant Troncoso MD    Placement  Needle size: 18 G  Laterality: right  Location: external jugular  Local anesthetic: none  Site prep: alcohol

## 2024-03-29 NOTE — ANESTHESIA POSTPROCEDURE EVALUATION
Patient: Ermelinda Curry    Procedure Summary       Date: 03/29/24 Room / Location: [facility] OR 03 / [facility]    Anesthesia Start: 0743 Anesthesia Stop: 1049    Procedures:       Hysterectomy Robot-Assisted (Abdomen)      Exploration Laparoscopy Robot-Assisted (Bilateral)      Cystoscopy Rigid (Bilateral) Diagnosis:       Pelvic pain      Endometriosis      Adenomyosis      (Pelvic pain [R10.2])      (Endometriosis [N80.9])      (Adenomyosis [N80.03])    Surgeons: Britt Oh MD Responsible Provider: Ada Kenyon MD    Anesthesia Type: general ASA Status: 2            Anesthesia Type: general    Vitals Value Taken Time   /85 03/29/24 1304   Temp 36.5 °C (97.7 °F) 03/29/24 1300   Pulse 86 03/29/24 1300   Resp 12 03/29/24 1300   SpO2 98 % 03/29/24 1300   Vitals shown include unvalidated device data.    Anesthesia Post Evaluation    Patient location during evaluation: PACU  Patient participation: complete - patient participated  Level of consciousness: awake and alert  Pain management: satisfactory to patient  Airway patency: patent  Cardiovascular status: acceptable and hemodynamically stable  Respiratory status: acceptable  Hydration status: acceptable  Postoperative Nausea and Vomiting: none    No notable events documented.

## 2024-03-29 NOTE — ANESTHESIA PROCEDURE NOTES
Airway  Date/Time: 3/29/2024 7:57 AM  Urgency: elective    Airway not difficult    Staffing  Performed: SRNA   Authorized by: Ada Kenyon MD    Performed by: Jose Gleason RN  Patient location during procedure: OR    Indications and Patient Condition  Indications for airway management: anesthesia and airway protection  Spontaneous Ventilation: absent  Sedation level: deep  Preoxygenated: yes  Patient position: sniffing  Mask difficulty assessment: 1 - vent by mask  Planned trial extubation    Final Airway Details  Final airway type: endotracheal airway      Successful airway: ETT  Cuffed: yes   Successful intubation technique: direct laryngoscopy  Facilitating devices/methods: intubating stylet  Endotracheal tube insertion site: oral  Blade: Layton  Blade size: #3  Cormack-Lehane Classification: grade I - full view of glottis  Placement verified by: chest auscultation and capnometry   Inital cuff pressure (cm H2O): 22  Measured from: gums  Number of other approaches attempted: 0

## 2024-03-29 NOTE — ANESTHESIA PREPROCEDURE EVALUATION
Patient: Ermelinda Curry    Procedure Information       Date/Time: 03/29/24 0715    Procedures:       Hysterectomy Robot-Assisted (Abdomen)      Exploration Laparoscopy Robot-Assisted - excision of endometriosis      Cystoscopy Rigid    Location: [facility] OR 03 / Virtual [facility] OR    Surgeons: Britt Oh MD            Relevant Problems   Hematology   (+) Factor 5 Leiden mutation, heterozygous (CMS/HCC)      GYN   (+) Endometriosis     Past Medical History:   Diagnosis Date    Acute upper respiratory infection, unspecified 04/02/2016    Acute upper respiratory infection    Factor 5 Leiden mutation, heterozygous (CMS/HCC)     Labyrinthine fistula, 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    Other specified health status 04/07/2015    No pertinent past medical history   Fibromylagia use Tylenol with Codeine and Medical Marijuana  Asthma when she child  Hypothyroidism   Eosinophilic colitis  Eosinophilic enteritis     Past Surgical History:   Procedure Laterality Date    OTHER SURGICAL HISTORY  04/07/2015    History Of Prior Surgery      Social History     Socioeconomic History    Marital status:      Spouse name: Not on file    Number of children: Not on file    Years of education: Not on file    Highest education level: Not on file   Occupational History    Not on file   Tobacco Use    Smoking status: Every Day     Packs/day: 1.00     Years: 10.00     Additional pack years: 0.00     Total pack years: 10.00     Types: Cigarettes    Smokeless tobacco: Never   Substance and Sexual Activity    Alcohol use: Not Currently     Alcohol/week: 10.0 standard drinks of alcohol     Types: 10 Shots of liquor per week    Drug use: Yes     Types: Marijuana    Sexual activity: Not on file   Other Topics Concern    Not on file   Social History Narrative    Not on file     Social Determinants of Health     Financial Resource Strain: Not on file   Food Insecurity: Not on file   Transportation Needs: Not  "on file   Physical Activity: Not on file   Stress: Not on file   Social Connections: Not on file   Intimate Partner Violence: Not on file   Housing Stability: Not on file       Chemistry    No results found for: \"NA\", \"K\", \"CL\", \"CO2\", \"BUN\", \"CREATININE\", \"GLU\" No results found for: \"CALCIUM\", \"ALKPHOS\", \"AST\", \"ALT\", \"BILITOT\"     LABS from  OSH o[facility] 2/2024 WNL ,    No results found for: \"WBC\", \"HGB\", \"HCT\", \"PLT\"  No results found for: \"PROTIME\", \"PTT\", \"INR\"  No results found for this or any previous visit (from the past 4464 hour(s)).  No results found for this or any previous visit from the past 1095 days.   Clinical information reviewed:   Tobacco  Allergies  Meds   Med Hx  Surg Hx  OB Status  Fam Hx  Soc   Hx        NPO Detail:  NPO/Void Status  Date of Last Liquid: 03/29/24  Time of Last Liquid: 0400  Date of Last Solid: 03/28/24  Time of Last Solid: 1900  Last Intake Type: Clear fluids (sips of water with meds)  Time of Last Void: 0300         Physical Exam    Airway  Mallampati: I  TM distance: >3 FB  Neck ROM: full     Cardiovascular    Dental    Pulmonary    Abdominal            Anesthesia Plan    History of general anesthesia?: yes  History of complications of general anesthesia?: no    ASA 2     general     The patient is a current smoker.  Patient smoked on day of procedure.    intravenous induction   Anesthetic plan and risks discussed with patient.  Use of blood products discussed with patient who consented to blood products.      "

## 2024-04-05 LAB
LABORATORY COMMENT REPORT: NORMAL
PATH REPORT.FINAL DX SPEC: NORMAL
PATH REPORT.GROSS SPEC: NORMAL
PATH REPORT.RELEVANT HX SPEC: NORMAL
PATH REPORT.TOTAL CANCER: NORMAL

## 2024-04-17 NOTE — PROGRESS NOTES
Division of Minimally Invasive Gynecologic Surgery  [facility]  CC: post-op visit    Subjective     History of Present Illness:   Ms. Curry is a 43 y.o. who presents for post-op visit.     Date: 3/29/24  Surgery: Robotic TLH, BS, cystoscopy  Findings: Exam under anesthesia revealed 10 week size uterus which was globular and boggy with an anterior fibroid. Diagnostic laparoscopy demonstrated normal abdominal and pelvic anatomy No evidence of endometriosis. Normal appearing ovaries with a corpus luteal cyst on the right. Cystoscopy revealed normal bladder mucosa with efflux of urine from bilateral ureteral orifices.    Pathology:   A. UTERUS, CERVIX, AND BILATERAL FALLOPIAN TUBES, HYSTERECTOMY AND BILATERAL SALPINGECTOMY:  -- Myometrium with adenomyosis and leiomyoma  -- Secretory pattern endometrium  -- Cervix with no pathologic diagnosis  -- Bilateral fallopian tubes with paratubal cysts    Since her surgery, she has been doing well. Denies fevers, chills, incisional complaints.     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    Other specified health status 04/07/2015    No pertinent past medical history       Past Surgical History:   Procedure Laterality Date    OTHER SURGICAL HISTORY  04/07/2015    History Of Prior Surgery       Social History     Socioeconomic History    Marital status:      Spouse name: Not on file    Number of children: Not on file    Years of education: Not on file    Highest education level: Not on file   Occupational History    Not on file   Tobacco Use    Smoking status: Every Day     Current packs/day: 1.00     Average packs/day: 1 pack/day for 10.0 years (10.0 ttl pk-yrs)     Types: Cigarettes    Smokeless tobacco:  Never   Substance and Sexual Activity    Alcohol use: Not Currently     Alcohol/week: 10.0 standard drinks of alcohol     Types: 10 Shots of liquor per week    Drug use: Yes     Types: Marijuana    Sexual activity: Not on file   Other Topics Concern    Not on file   Social History Narrative    Not on file     Social Determinants of Health     Financial Resource Strain: Not on File (2019)    Received from [facility]     Financial Resource Strain     Financial Resource Strain: 0   Food Insecurity: Not on File (2019)    Received from [facility]     Food Insecurity     Food: 0   Transportation Needs: Not on File (2019)    Received from [facility]     Transportation Needs     Transportation: 0   Physical Activity: Not on File (2019)    Received from [facility]     Physical Activity     Physical Activity: 0   Stress: Not on File (2019)    Received from [facility]     Stress     Stress: 0   Social Connections: Not on File (2019)    Received from [facility]     Social Connections     Social Connections and Isolation: 0   Intimate Partner Violence: Not on file   Housing Stability: Not on File (2019)    Received from [facility]     Housing Stability     Housin         Current Outpatient Medications:     acetaminophen (Tylenol) 500 mg tablet, Take 2 tablets (1,000 mg) by mouth every 6 hours if needed for mild pain (1 - 3)., Disp: 60 tablet, Rfl: 1    levothyroxine (Synthroid, Levoxyl) 50 mcg tablet, once every 24 hours., Disp: , Rfl:     loperamide (Imodium A-D) 1 mg/7.5 mL liquid, Take by mouth twice a day., Disp: , Rfl:     loperamide (Imodium) 2 mg capsule, take 1 capsule as needed Orally twice daily 30 days, Disp: , Rfl:     naloxone (Narcan) 4 mg/0.1 mL nasal spray, Administer 1 spray (4 mg) into affected nostril(s) if needed for opioid reversal or respiratory depression. May repeat every 2-3 minutes if needed, alternating nostrils, until medical assistance becomes available., Disp: 2 each, Rfl: 1    ondansetron  (Zofran) 8 mg tablet, , Disp: , Rfl:     oxyCODONE (Roxicodone) 5 mg immediate release tablet, Take 1 tablet (5 mg) by mouth every 6 hours if needed for moderate pain (4 - 6) or severe pain (7 - 10)., Disp: 10 tablet, Rfl: 0    polyethylene glycol (Glycolax, Miralax) 17 gram packet, Take 17 g by mouth once daily., Disp: 10 packet, Rfl: 1    zolpidem (Ambien) 10 mg tablet, , Disp: , Rfl:     Allergies   Allergen Reactions    Bee Venom Protein (Honey Bee) Shortness of breath    Estrogens Other     Other reaction(s): Other: See Comments   Clotting disorder and worsening depression    Clotting disorder and worsening depression    Etonogestrel Other     I got very depressed    Fish Containing Products Anaphylaxis and Other     Shortness of breath, wheezing, and vomiting    Ketorolac Anaphylaxis, Unknown, Other and Shortness of breath     Pt states she takes motrin at home    Metoclopramide Anaphylaxis, Unknown, Other and Shortness of breath    Spinach Other     Other reaction(s): Other: See Comments   Upset stomach and migraine headache    Upset stomach and migraine headache    Bee Pollen Other    Ciprofloxacin Other    Copper Hives, Swelling and Unknown    Diphenhydramine Other     Other reaction(s): Other: See Comments   speed, muscle spasms    speed, muscle spasms    Fentanyl Other and Unknown     Other reaction(s): Other: See Comments   Migraine    Migraine    Fluoxetine Hives    Gluten Nausea/vomiting    Milk Containing Products (Dairy) Other    Nitrofurantoin Monohyd/M-Cryst Unknown    Peanut GI Upset    Promethazine Other and Unknown     Other reaction(s): Other: See Comments, Other: See Comments   Muscle spasms    Muscle spasms    Sulfa (Sulfonamide Antibiotics) Unknown    Latex Unknown and Rash     WHEEZING IF CLOSE TO NECK    Macrolide Antibiotics Rash    Penicillins Unknown, Other and Rash       Review of Systems    Objective     There were no vitals filed for this visit.    Physical Exam:   General:  Well-developed, well-nourished, alert and cooperative female who is oriented ×4 with intact memory.  She appears to be in no acute distress.  Lungs: Normal work of breathing.  Chest symmetrical with good excursion and no accessory muscles in use.  Abdomen soft and nontender.  Incisions healing well.   Extremities:  Regular full range of motion.  No clubbing cyanosis or edema  Skin: Warm and dry.  Intact without rashes, lesions or ulcers.  Gynecologic: External genitalia with normal appearance and hair distribution. No lesions or ulcers. Vaginal cuff healing well.      Assessment/Plan   Ermelinda Curry is a 43 y.o. female s/p robotic hysterectomy on 3/29/24 who presents for post-op visit     Path discussed  Routine postop precautions  F/u with primary OB/GYN for annuals  Patient pleased with today's visit     Britt Oh MD  Division of Minimally Invasive Gynecologic Surgery

## 2024-04-23 ENCOUNTER — OFFICE VISIT (OUTPATIENT)
Dept: OBSTETRICS AND GYNECOLOGY | Facility: CLINIC | Age: 44
End: 2024-04-23
Payer: COMMERCIAL

## 2024-04-23 VITALS
BODY MASS INDEX: 24.75 KG/M2 | HEIGHT: 64 IN | WEIGHT: 145 LBS | SYSTOLIC BLOOD PRESSURE: 112 MMHG | DIASTOLIC BLOOD PRESSURE: 60 MMHG

## 2024-04-23 DIAGNOSIS — Z09 POSTOP CHECK: Primary | ICD-10-CM

## 2024-04-23 PROCEDURE — 99024 POSTOP FOLLOW-UP VISIT: CPT | Performed by: STUDENT IN AN ORGANIZED HEALTH CARE EDUCATION/TRAINING PROGRAM

## (undated) DEVICE — DRESSING, NON-ADHERENT, TELFA, OUCHLESS, 3 X 8 IN, STERILE

## (undated) DEVICE — MANIFOLD, 4 PORT NEPTUNE STANDARD

## (undated) DEVICE — Device

## (undated) DEVICE — PROTECTOR, NERVE, ULNAR, PINK

## (undated) DEVICE — GLOVE, SURGICAL, PROTEXIS,  7.0, PF, LATEX

## (undated) DEVICE — BAG, DRAIN METER, URINE, 350CC, LF

## (undated) DEVICE — COVER, CART, 45 X 27 X 48 IN, CLEAR

## (undated) DEVICE — DRAPE, IRRIGATION, W/POUCH, ADHESIVE STRIP, STERI DRAPE, 19 X 23 IN, DISPOSABLE, STERILE

## (undated) DEVICE — DRAPE, PAD, PREP, W/ 9 IN CUFF, 24 X 41, LF, NS

## (undated) DEVICE — GOWN, ASTOUND, XL

## (undated) DEVICE — MARKER, SKIN, RULER AND LABEL PACK, CUSTOM

## (undated) DEVICE — COLLECTION BAG, FLUID, F/STERIS LOOP, STERILE

## (undated) DEVICE — DRAPE, INSTRUMENT ARM

## (undated) DEVICE — SYRINGE, 60 CC, LUER LOCK, MONOJECT

## (undated) DEVICE — OCCLUDER, COLPO-PNEUMO

## (undated) DEVICE — COVER, LIGHT HANDLE, SURGICAL, FLEXIBLE, DISPOSABLE, STERILE

## (undated) DEVICE — SYRINGE, 60 CC, IRRIGATION, BULB, CONTRO-BULB, PAPER POUCH

## (undated) DEVICE — CATHETER, URETHRAL, FOLEY, 2 WAY, 16 FR, 5 CC, SILICONE

## (undated) DEVICE — TUBING, SUCTION, CONNECTING, STERILE 0.25 X 120 IN., LF

## (undated) DEVICE — SUTURE, VICRYL, 0, 27 IN, UR-6, VIOLET

## (undated) DEVICE — TOWEL, SURGICAL, NEURO, O/R, 16 X 26, BLUE, STERILE

## (undated) DEVICE — PAD, SANITARY, OBSTETRICAL, W/ADHSV STRIP,11 IN,LF

## (undated) DEVICE — COVER, TIP HOT SHEARS ENDOWRIST

## (undated) DEVICE — COVER, TABLE, 44 X 75 IN, DISPOSABLE, LF, STERILE

## (undated) DEVICE — BASIN SET, D & C

## (undated) DEVICE — KIT, ROBOTIC, CUSTOM UHC

## (undated) DEVICE — DRAPE PACK, UNIVERSAL II

## (undated) DEVICE — REST, HEAD, BAGEL, 9 IN

## (undated) DEVICE — OBTURATOR, BLADELESS , SU

## (undated) DEVICE — DRESSING, ADHESIVE, ISLAND, TELFA, 2 X 3.75 IN, LF

## (undated) DEVICE — PREP TRAY, SKIN, DRY, W/GLOVES

## (undated) DEVICE — NEEDLE, INSUFFLATION, 14 G, 100 MM

## (undated) DEVICE — TUBING SET, TRI-LUMEN, FILTERED, F/AIRSEAL

## (undated) DEVICE — DRESSING, NON-ADHERENT, TELFA, 3 X 8 IN, NS

## (undated) DEVICE — GOWN, SURGICAL, SMARTGOWN, XLARGE, STERILE

## (undated) DEVICE — SUTURE, MONOCRYL PLUS, 4-0, PS-2 UD 27IN

## (undated) DEVICE — SPONGE, GAUZE, XRAY DECT, 16 PLY, 4 X 4, W/MASTER DMT,STERILE

## (undated) DEVICE — STRIP, SKIN CLOSURE, STERI STRIP, REINFORCED, 0.5 X 4 IN

## (undated) DEVICE — TOWELS 4-PK

## (undated) DEVICE — ADHESIVE, SKIN, MASTISOL, 2/3 CC VIAL

## (undated) DEVICE — CATHETER, URETHRAL, ROBNEL, 16 FR, 16 IN, LF, RED

## (undated) DEVICE — COVER, EQUIPMENT, CAMERA, 5 X 96 IN, STERILE

## (undated) DEVICE — DRAPE, COLUMN, DAVINCI XI

## (undated) DEVICE — APPLICATOR, ARISTA, FLEXITIP, XL, LF

## (undated) DEVICE — SEAL, UNIVERSAL 5-8MM  XI

## (undated) DEVICE — GOWN, ASTOUND, L

## (undated) DEVICE — SYRINGE, HYPODERMIC, LUER LOCK, 6 CC

## (undated) DEVICE — RETRACTOR, CERVICAL CUP, VCARE, STANDARD

## (undated) DEVICE — DRAPE, UNDERBUTTOCKS

## (undated) DEVICE — HEMOSTAT, ARISTA, ABSORBABLE, 3 GRAMS